# Patient Record
Sex: FEMALE | Race: WHITE | NOT HISPANIC OR LATINO | Employment: STUDENT | URBAN - METROPOLITAN AREA
[De-identification: names, ages, dates, MRNs, and addresses within clinical notes are randomized per-mention and may not be internally consistent; named-entity substitution may affect disease eponyms.]

---
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
## 2022-01-16 ENCOUNTER — APPOINTMENT (OUTPATIENT)
Dept: CT IMAGING | Facility: CLINIC | Age: 22
End: 2022-01-16
Attending: EMERGENCY MEDICINE
Payer: COMMERCIAL

## 2022-01-16 ENCOUNTER — HOSPITAL ENCOUNTER (EMERGENCY)
Facility: CLINIC | Age: 22
Discharge: HOME | End: 2022-01-16
Attending: EMERGENCY MEDICINE | Admitting: EMERGENCY MEDICINE
Payer: COMMERCIAL

## 2022-01-16 ENCOUNTER — APPOINTMENT (OUTPATIENT)
Dept: GENERAL RADIOLOGY | Facility: CLINIC | Age: 22
End: 2022-01-16
Attending: EMERGENCY MEDICINE
Payer: COMMERCIAL

## 2022-01-16 VITALS
BODY MASS INDEX: 22.66 KG/M2 | WEIGHT: 120 LBS | RESPIRATION RATE: 16 BRPM | SYSTOLIC BLOOD PRESSURE: 119 MMHG | OXYGEN SATURATION: 100 % | HEART RATE: 69 BPM | HEIGHT: 61 IN | DIASTOLIC BLOOD PRESSURE: 66 MMHG | TEMPERATURE: 98.1 F

## 2022-01-16 DIAGNOSIS — S60.221A CONTUSION OF RIGHT HAND, INITIAL ENCOUNTER: ICD-10-CM
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
## 2022-01-16 DIAGNOSIS — S16.1XXA STRAIN OF NECK MUSCLE, INITIAL ENCOUNTER: ICD-10-CM

## 2022-01-16 PROCEDURE — 250N000013 HC RX MED GY IP 250 OP 250 PS 637: Performed by: EMERGENCY MEDICINE

## 2022-01-16 PROCEDURE — 99284 EMERGENCY DEPT VISIT MOD MDM: CPT | Mod: 25

## 2022-01-16 PROCEDURE — 72125 CT NECK SPINE W/O DYE: CPT

## 2022-01-16 PROCEDURE — 73130 X-RAY EXAM OF HAND: CPT | Mod: RT

## 2022-01-16 RX ORDER — ACETAMINOPHEN 325 MG/1
650 TABLET ORAL ONCE
Status: COMPLETED | OUTPATIENT
Start: 2022-01-16 | End: 2022-01-16

## 2022-01-16 RX ADMIN — ACETAMINOPHEN 650 MG: 325 TABLET, FILM COATED ORAL at 20:05

## 2022-01-16 ASSESSMENT — MIFFLIN-ST. JEOR: SCORE: 1256.7

## 2022-01-16 ASSESSMENT — ENCOUNTER SYMPTOMS
NECK PAIN: 1
VOMITING: 0
ARTHRALGIAS: 1
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
## 2022-01-31 ENCOUNTER — TELEPHONE (OUTPATIENT)
Dept: AUDIOLOGY | Facility: CLINIC | Age: 22
End: 2022-01-31
Payer: COMMERCIAL

## 2022-01-31 NOTE — TELEPHONE ENCOUNTER
[facility] Call Center    Phone Message    May a detailed message be left on voicemail: yes     Reason for Call: Other: Pt's mom called into see about pt transferring to adult audiology. Pt is having issues with he BAHA . Please call to discuss. Thank you     Action Taken: Other: Audiology    Travel Screening: Not Applicable

## 2022-02-01 NOTE — TELEPHONE ENCOUNTER
LVM asking patient/patient's mother to call back and let us know additional information regarding issues with BAHA. Wondering how old it is and what is not working. Let them know [employer]onic is not a device we typically work with at the adult clinic so we need additional information to determine next steps (ie upgrade or repair) and where this can be completed at.     Christofer Traore. CCC-A  Licensed Audiologist   [place] #[id number]

## 2022-02-02 NOTE — TELEPHONE ENCOUNTER
Emailed with Aleyda Serrano, Cherrie form the [facility] clinic. As we do not have Sophono programming abilities a this clinic, patient will go through them for repair.         María Elena Aly, [facility]  Licensed Audiologist  [place] #[id number]

## 2022-02-15 ENCOUNTER — TELEPHONE (OUTPATIENT)
Dept: AUDIOLOGY | Facility: CLINIC | Age: 22
End: 2022-02-15
Payer: COMMERCIAL

## 2022-02-15 NOTE — TELEPHONE ENCOUNTER
[facility] Call Center    Phone Message    May a detailed message be left on voicemail: yes     Reason for Call: Appointment Intake    Referring Provider Name: [facility]  Diagnosis and/or Symptoms: Broken Sophono device - Pt has reached out to [place] & has yet to hear back in regards to the broken hearing device. Pt states that [facility] is referring her to Dr. Naga Dewitt. Please reach out to Pt with scheduling options. Thank you!    Action Taken: Message routed to:  [facility] ([facility] Audiology coordinators    Travel Screening: Not Applicable

## 2022-03-05 ENCOUNTER — HEALTH MAINTENANCE LETTER (OUTPATIENT)
Age: 22
End: 2022-03-05

## 2022-03-09 ENCOUNTER — OFFICE VISIT (OUTPATIENT)
Dept: AUDIOLOGY | Facility: CLINIC | Age: 22
End: 2022-03-09
Attending: PEDIATRICS
Payer: COMMERCIAL

## 2022-03-09 PROCEDURE — 999N000019 HC STATISTIC AUDIOLOGY FOLLOW UP HEARING AID VISIT: Performed by: AUDIOLOGIST

## 2022-03-10 NOTE — PROGRESS NOTES
AUDIOLOGY REPORT     SUBJECTIVE: Carley Campos, 21 year old female, was seen in the [facility] on 03/09/2022 to receive a replacement Sophono Alpha 2 MPO E+ processor. Carley's hearing was last assessed on 12/27/2018, and results revealed normal hearing in the right ear and severe rising to moderate,maximal conductive hearing loss in the left ear. Carley originally upgraded to the processor on 5/2/2018 but the device broke and they are just sending her a new one. Carley is enrolled at [facility].      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 She has normal hearing at her right ear and received a Sophono bone conduction sound processor system with surgical implant in February 2012 by Dr. Robert Reed.  The device was turned on in March 2012 in [place], [place] at [facility].       OBJECTIVE: Carley's replacement Sophono Alpha 2 MPO E+ sound processor was connected to programming software. Frequency-specific bands did not need to be adjusted. Carley reported a good sound quality and had no feedback.     Discussed that the future of new generation products is unknown with [employer] now supporting the Sophono device is unknown. She and her father had already discussed possibly switching over to the [employer] BAHA device if they ever discontinued the Sophono processors. A [employer]chle[employer]'s BAHA Attract magnet was used with a BAHA 6 Max to see if the magnet would connect and she would be able to hear sound. We confirmed that this did work, although it sounded much different, which was likely because it was not programmed specifically for her.      ASSESSMENT: Carley received a replacement Sophono Alpha 2 MPO E+ processor today for her left ear. No charge to her or her insurance.      PLAN: Continue wearing device full time. Reach out if she  has any issues with the device or needs programming changes. Please call this clinic at [phone] with questions regarding these results or recommendations.    Christofer Mena.  Licensed Audiologist  [place] #[id number]

## 2022-11-20 ENCOUNTER — HEALTH MAINTENANCE LETTER (OUTPATIENT)
Age: 22
End: 2022-11-20

## 2023-01-29 ENCOUNTER — HOSPITAL ENCOUNTER (EMERGENCY)
Facility: CLINIC | Age: 23
Discharge: HOME | End: 2023-01-29
Attending: EMERGENCY MEDICINE | Admitting: EMERGENCY MEDICINE
Payer: COMMERCIAL

## 2023-01-29 VITALS
TEMPERATURE: 97.4 F | OXYGEN SATURATION: 100 % | RESPIRATION RATE: 16 BRPM | DIASTOLIC BLOOD PRESSURE: 100 MMHG | HEART RATE: 94 BPM | SYSTOLIC BLOOD PRESSURE: 144 MMHG

## 2023-01-29 DIAGNOSIS — L08.9 INFECTED EMBEDDED EARRING: ICD-10-CM

## 2023-01-29 DIAGNOSIS — S00.459A INFECTED EMBEDDED EARRING: ICD-10-CM

## 2023-01-29 PROCEDURE — 99283 EMERGENCY DEPT VISIT LOW MDM: CPT

## 2023-01-29 PROCEDURE — 10120 INC&RMVL FB SUBQ TISS SMPL: CPT

## 2023-01-29 RX ORDER — CEPHALEXIN 500 MG/1
500 CAPSULE ORAL 4 TIMES DAILY
Qty: 28 CAPSULE | Refills: 0 | Status: SHIPPED | OUTPATIENT
Start: 2023-01-29 | End: 2023-02-05

## 2023-01-29 ASSESSMENT — ENCOUNTER SYMPTOMS
FEVER: 0
WOUND: 1

## 2023-01-29 NOTE — ED PROVIDER NOTES
History     Chief Complaint:  Wound Check      HPI   Carley Campos is a 22 year old female who presents accompanied by her father for a wound check. The patient has had a earring in her right ear present for about six months. About a week ago after sleeping on the ear she started to develop some pain to the area and about three days ago she developed swelling, discharge, and increased pain to the area. She has been unable to remove the earring and today she went into her clinic abut this where they were also unable to remove the earring. Due to concern for this she was advised to come into the ED. She has not had any fever.        ROS:  Review of Systems   Constitutional: Negative for fever.   HENT: Positive for ear pain (right).    Skin: Positive for wound.   All other systems reviewed and are negative.      Allergies:  No known drug allergies      Medications:    Buspirone     Past Medical History:    Attention deficit disorder      Social History:  The patient presents to the ED accompanied by her father.   PCP: Clinic, [facility]     Physical Exam     Patient Vitals for the past 24 hrs:   BP Temp Pulse Resp SpO2   01/29/23 1135 (!) 144/100 97.4  F (36.3  C) 94 16 100 %        Physical Exam  General: Adult female sitting upright  HENT: There is edema and erythema of the tragus of the right ear where there is an embedded earring.  No bleeding.  No purulent discharge.  No spreading erythema.    MSK: Ambulatory.  Skin: Warm and dry.  Localized erythema to the right ear tragus.   Neuro: Alert and oriented. Responds appropriately to all questions and commands.   Psych: Normal mood and affect. Pleasant.    Emergency Department Course     Procedures      Foreign Body Removal     Procedure: Foreign Body Removal    Consent: Verbal    Risks Discussed: pain, bleeding, damage to adjacent structures, incomplete removal, infection and repeat attempts    Indication: Foreign body     Location:  Ear    Preparation: None     Anesthesia/Sedation: None     Procedure Detail:   Description: The earring was grasped, gently pulled, and then manually unscrewed and removed.     Patient Status: The patient tolerated the procedure well: Yes. There were no complications.       Emergency Department Course & Assessments:    Social Determinants of Health affecting care:  None apparent    Assessments:  1153: The patient was seen and evaluated. A foreign body removal was performed as outlined in the procedure note above.  The patient tolerated well and there were no complications.      Disposition:  The patient was discharged to home.     Impression & Plan        Medical Decision Making:  Carley Campos is a 22 year old female who presents for evaluation of a foreign body (earring) embedded and causing pain and swelling in the tragus of the right ear.  This was successfully removed, see above procedure note.  There is mild inflammation versus infection localized to the site of the embedded earring, now fully removed without retained foreign body evident.  She is nontoxic in appearance and there is no spreading cellulitis.  Will cover for possible mild skin infection/cellulitis.  Suspect significant provement now that the embedded foreign bodies removed.  Patient is more comfortable after removal.  Will have them follow up with primary care in 2 to 3 days for wound check.  Risk of infection discussed.  Recommended to return should she has spreading erythema, fever, increasing pain or swelling.  All questions were answered prior to discharge     Diagnosis:    ICD-10-CM    1. Infected embedded earring  S00.459A     L08.9     removed          Discharge Medications:  New Prescriptions    CEPHALEXIN (KEFLEX) 500 MG CAPSULE    Take 1 capsule (500 mg) by mouth 4 times daily for 7 days         Scribe Disclosure:  Naga VICTORIA, am serving as a scribe at 11:46 AM on 1/29/2023 to document services personally performed  by Kala Parker MD based on my observations and the provider's statements to me.   1/29/2023   Kala Parker MD Jonkman, Tracy Dianne, MD  01/29/23 1248

## 2023-01-29 NOTE — ED TRIAGE NOTES
Patient sent to the ED from clinic for removal of her earring. Patient states she had the piercing done 6 months ago and it began draining 1 week ago. States had increased pain and bleeding after sleeping on the ear last night. Clinic was unable to remove the earring.

## 2023-01-29 NOTE — DISCHARGE INSTRUCTIONS
You have signs of possibly mild infection and inflammation of the right ear.  We will treat with antibiotic to avoid worsening, however removal of the earring may have solved the problem.  Follow-up as needed to primary care provider or return here with worsening.

## 2023-04-15 ENCOUNTER — HEALTH MAINTENANCE LETTER (OUTPATIENT)
Age: 23
End: 2023-04-15

## 2023-10-13 ENCOUNTER — TELEPHONE (OUTPATIENT)
Dept: AUDIOLOGY | Facility: CLINIC | Age: 23
End: 2023-10-13
Payer: COMMERCIAL

## 2023-10-23 NOTE — TELEPHONE ENCOUNTER
LVM with mother Elena regarding callback for more info regarding audiology/ENT info and upcoming appointment. (Scheduling note says mother called to make appt).    -Grecia WILKS 10/23/23

## 2023-10-25 DIAGNOSIS — H91.90 HL (HEARING LOSS): Primary | ICD-10-CM

## 2023-12-11 ENCOUNTER — TELEPHONE (OUTPATIENT)
Dept: AUDIOLOGY | Facility: CLINIC | Age: 23
End: 2023-12-11
Payer: COMMERCIAL

## 2023-12-11 NOTE — TELEPHONE ENCOUNTER
"Carley called the clinic, apologizing for length of time since writer had contacted. She said she was interested in getting a new Sophono sound processor as she \"doesn't know what happened to hers\". I informed her that clinic was notified that [employer] had sold Sophono to an [employer] company, and she said she had not received info from them on this. I told her that our audiologists & ENT doctors who do surgery for bone-anchored hearing devices would be willing to meet/discuss reimplantation of a new device. She asked that I email info we had from [employer], and that she would consider that info. Gave direct # for questions or scheduling new appointments.    -Grecia WILKS 12/11/24  "

## 2023-12-28 ENCOUNTER — LAB REQUISITION (OUTPATIENT)
Dept: LAB | Facility: CLINIC | Age: 23
End: 2023-12-28

## 2023-12-28 DIAGNOSIS — Z01.419 ENCOUNTER FOR GYNECOLOGICAL EXAMINATION (GENERAL) (ROUTINE) WITHOUT ABNORMAL FINDINGS: ICD-10-CM

## 2023-12-28 PROCEDURE — G0145 SCR C/V CYTO,THINLAYER,RESCR: HCPCS | Performed by: OBSTETRICS & GYNECOLOGY

## 2024-01-02 LAB
BKR LAB AP GYN ADEQUACY: NORMAL
BKR LAB AP GYN INTERPRETATION: NORMAL
BKR LAB AP HPV REFLEX: NORMAL
BKR LAB AP LMP: NORMAL
BKR LAB AP PREVIOUS ABNL DX: NORMAL
BKR LAB AP PREVIOUS ABNORMAL: NORMAL
PATH REPORT.COMMENTS IMP SPEC: NORMAL
PATH REPORT.COMMENTS IMP SPEC: NORMAL
PATH REPORT.RELEVANT HX SPEC: NORMAL

## 2024-06-22 ENCOUNTER — HEALTH MAINTENANCE LETTER (OUTPATIENT)
Age: 24
End: 2024-06-22

## 2024-10-17 ENCOUNTER — OFFICE VISIT (OUTPATIENT)
Dept: URGENT CARE | Facility: URGENT CARE | Age: 24
End: 2024-10-17
Payer: COMMERCIAL

## 2024-10-17 VITALS
SYSTOLIC BLOOD PRESSURE: 106 MMHG | WEIGHT: 115 LBS | HEART RATE: 74 BPM | OXYGEN SATURATION: 99 % | TEMPERATURE: 97.9 F | BODY MASS INDEX: 21.73 KG/M2 | DIASTOLIC BLOOD PRESSURE: 74 MMHG

## 2024-10-17 DIAGNOSIS — H92.01 OTALGIA, RIGHT: Primary | ICD-10-CM

## 2024-10-17 PROCEDURE — 99203 OFFICE O/P NEW LOW 30 MIN: CPT | Performed by: PHYSICIAN ASSISTANT

## 2024-10-17 RX ORDER — ACETAMINOPHEN AND CODEINE PHOSPHATE 120; 12 MG/5ML; MG/5ML
1 SOLUTION ORAL
COMMUNITY
Start: 2023-06-14

## 2024-10-17 RX ORDER — MECLIZINE HCL 12.5 MG 12.5 MG/1
TABLET ORAL
COMMUNITY

## 2024-10-17 RX ORDER — METRONIDAZOLE 500 MG/1
1 TABLET ORAL
COMMUNITY
Start: 2024-10-14

## 2024-10-17 RX ORDER — GABAPENTIN 300 MG/1
CAPSULE ORAL
COMMUNITY
Start: 2023-06-26

## 2024-10-17 ASSESSMENT — ENCOUNTER SYMPTOMS
COUGH: 0
SINUS PRESSURE: 0
SINUS PAIN: 0
FEVER: 1
SORE THROAT: 1
RHINORRHEA: 0

## 2024-10-17 NOTE — PROGRESS NOTES
Assessment & Plan:        ICD-10-CM    1. Otalgia, right  H92.01             Plan/Clinical Decision Making:    Patient with acute right ear pain. Had recent mild fever with mild ST.   Patient has histroy of previous left ear surgery and issues and cautious about right ear. Normal tympanic membranes today.   Can treat with Tylenol as needed.       Return if symptoms worsen or fail to improve, for in 2-3 days.     At the end of the encounter, I discussed results, diagnosis, medications. Discussed red flags for immediate return to clinic/ER, as well as indications for follow up if no improvement. Patient understood and agreed to plan. Patient was stable for discharge.        Trice Guerrero PA-C on 10/17/2024 at 5:27 PM          Subjective:     HPI:    Carley is a 24 year old female who presents to clinic today for the following health issues:  Chief Complaint   Patient presents with    Urgent Care     Headache started today, vertigo, blur vision, ear pain,dizziness, swollen lymph node.     HPI    Patient with right ear pain. Ear pain started on Sunday. Yesterday pain worsened. Had a mild fever.   On Monday had mild ST.   Temp last night 100.4.  Patient has hx of migraine headaches. Has frequently. Has had a headache.   Has had some dizziness with ear pain.     Review of Systems   Constitutional:  Positive for fever.   HENT:  Positive for ear pain (right ear) and sore throat (mild). Negative for congestion, rhinorrhea, sinus pressure and sinus pain.    Respiratory:  Negative for cough.          Patient Active Problem List   Diagnosis    HL (hearing loss)        No past medical history on file.    Social History     Tobacco Use    Smoking status: Never    Smokeless tobacco: Never   Substance Use Topics    Alcohol use: Not on file             Objective:     Vitals:    10/17/24 1717   BP: 106/74   Pulse: 74   Temp: 97.9  F (36.6  C)   TempSrc: Tympanic   SpO2: 99%   Weight: 52.2 kg (115 lb)         Physical Exam    EXAM:   Pleasant, alert, appropriate appearance. NAD.  Head Exam: Normocephalic, atraumatic.  Eye Exam:  non icteric/injection.    Ear Exam: Right TM grey without bulging. Normal canal.  Normal pinna. Left ear with previous surgery and closed canal.   Nose Exam: Normal external nose.    OroPharynx Exam:  Moist mucous membranes. No erythema, pharynx without exudate or hypertrophy.  Neck/Thyroid Exam:  no adenopathy  Chest/Respiratory Exam: CTAB.  Cardiovascular Exam: RRR. No murmur or rubs.        Results:  No results found for any visits on 10/17/24.

## 2025-02-02 ENCOUNTER — HEALTH MAINTENANCE LETTER (OUTPATIENT)
Age: 25
End: 2025-02-02